# Patient Record
Sex: MALE | Race: BLACK OR AFRICAN AMERICAN | NOT HISPANIC OR LATINO | URBAN - METROPOLITAN AREA
[De-identification: names, ages, dates, MRNs, and addresses within clinical notes are randomized per-mention and may not be internally consistent; named-entity substitution may affect disease eponyms.]

---

## 2022-05-20 ENCOUNTER — OFFICE VISIT (OUTPATIENT)
Dept: URBAN - METROPOLITAN AREA CLINIC 118 | Facility: CLINIC | Age: 27
End: 2022-05-20
Payer: COMMERCIAL

## 2022-05-20 ENCOUNTER — LAB OUTSIDE AN ENCOUNTER (OUTPATIENT)
Dept: URBAN - METROPOLITAN AREA CLINIC 118 | Facility: CLINIC | Age: 27
End: 2022-05-20

## 2022-05-20 DIAGNOSIS — R12 HEARTBURN: ICD-10-CM

## 2022-05-20 DIAGNOSIS — K59.00 COLONIC CONSTIPATION: ICD-10-CM

## 2022-05-20 DIAGNOSIS — A04.8 HELICOBACTER PYLORI (H. PYLORI) INFECTION: ICD-10-CM

## 2022-05-20 DIAGNOSIS — R14.0 ABDOMINAL BLOATING: ICD-10-CM

## 2022-05-20 DIAGNOSIS — R10.13 DYSPEPSIA: ICD-10-CM

## 2022-05-20 PROCEDURE — 99204 OFFICE O/P NEW MOD 45 MIN: CPT | Performed by: INTERNAL MEDICINE

## 2022-05-20 RX ORDER — AMLODIPINE BESYLATE 10 MG/1
1 TABLET TABLET ORAL ONCE A DAY
Status: ACTIVE | COMMUNITY

## 2022-05-20 RX ORDER — PANTOPRAZOLE SODIUM 40 MG/1
1 TABLET TABLET, DELAYED RELEASE ORAL ONCE A DAY
Qty: 30 TABLET | Refills: 1 | OUTPATIENT
Start: 2022-05-20

## 2022-05-20 NOTE — HPI-TODAY'S VISIT:
This is a 27 yo male with pmh of anxiety here for evaluation for abdominal pain.  Complains of burning pain in the epigastric region. Unsure if related to eating. No nausea/vomiting.  Tried peptobismal.  He was tested positive for H pylori with breath test in 01/2022. He was given antibiotics but did not take the whole regimen.   He has been constipated since 01/2022. He has a BM at least once daily.  Has tried miralax and linzess samples. Also tried mag citrate. States he had diarrhea with linzess.   No rectal bleeding.

## 2022-05-31 ENCOUNTER — OFFICE VISIT (OUTPATIENT)
Dept: URBAN - METROPOLITAN AREA SURGERY CENTER 23 | Facility: SURGERY CENTER | Age: 27
End: 2022-05-31
Payer: COMMERCIAL

## 2022-05-31 DIAGNOSIS — B96.81 BACTERIAL INFECTION DUE TO H. PYLORI: ICD-10-CM

## 2022-05-31 DIAGNOSIS — K31.A11 INTESTINAL METAPLASIA OF ANTRUM OF STOMACH WITHOUT DYSPLASIA: ICD-10-CM

## 2022-05-31 DIAGNOSIS — K29.60 ADENOPAPILLOMATOSIS GASTRICA: ICD-10-CM

## 2022-05-31 DIAGNOSIS — K31.A12 INTESTINAL METAPLASIA OF BODY OF STOMACH WITHOUT DYSPLASIA: ICD-10-CM

## 2022-05-31 PROCEDURE — G8907 PT DOC NO EVENTS ON DISCHARG: HCPCS | Performed by: INTERNAL MEDICINE

## 2022-05-31 PROCEDURE — 43239 EGD BIOPSY SINGLE/MULTIPLE: CPT | Performed by: INTERNAL MEDICINE

## 2022-05-31 RX ORDER — AMLODIPINE BESYLATE 10 MG/1
1 TABLET TABLET ORAL ONCE A DAY
Status: ACTIVE | COMMUNITY

## 2022-05-31 RX ORDER — PANTOPRAZOLE SODIUM 40 MG/1
1 TABLET TABLET, DELAYED RELEASE ORAL ONCE A DAY
Qty: 30 TABLET | Refills: 1 | Status: ACTIVE | COMMUNITY
Start: 2022-05-20

## 2022-06-01 ENCOUNTER — ERX REFILL RESPONSE (OUTPATIENT)
Dept: URBAN - METROPOLITAN AREA CLINIC 118 | Facility: CLINIC | Age: 27
End: 2022-06-01

## 2022-06-01 RX ORDER — PANTOPRAZOLE SODIUM 40 MG/1
1 TABLET TABLET, DELAYED RELEASE ORAL ONCE A DAY
Qty: 30 TABLET | Refills: 1 | OUTPATIENT

## 2022-06-01 RX ORDER — PANTOPRAZOLE SODIUM 40 MG/1
TAKE 1 TABLET BY MOUTH EVERY DAY TABLET, DELAYED RELEASE ORAL
Qty: 90 TABLET | Refills: 1 | OUTPATIENT

## 2022-06-14 ENCOUNTER — TELEPHONE ENCOUNTER (OUTPATIENT)
Dept: URBAN - METROPOLITAN AREA CLINIC 118 | Facility: CLINIC | Age: 27
End: 2022-06-14

## 2022-06-20 ENCOUNTER — TELEPHONE ENCOUNTER (OUTPATIENT)
Dept: URBAN - METROPOLITAN AREA CLINIC 118 | Facility: CLINIC | Age: 27
End: 2022-06-20

## 2022-06-22 ENCOUNTER — TELEPHONE ENCOUNTER (OUTPATIENT)
Dept: URBAN - METROPOLITAN AREA CLINIC 92 | Facility: CLINIC | Age: 27
End: 2022-06-22

## 2022-06-22 ENCOUNTER — TELEPHONE ENCOUNTER (OUTPATIENT)
Dept: URBAN - METROPOLITAN AREA CLINIC 118 | Facility: CLINIC | Age: 27
End: 2022-06-22

## 2022-06-22 RX ORDER — AMOXICILLIN 500 MG/1
2 CAPSULES CAPSULE ORAL
Qty: 56 CAPSULE | Refills: 0 | OUTPATIENT
Start: 2022-06-22 | End: 2022-07-06

## 2022-06-22 RX ORDER — CLARITHROMYCIN 500 MG/1
1 TABLET TABLET, FILM COATED ORAL
Qty: 28 TABLET | Refills: 0 | OUTPATIENT
Start: 2022-06-22 | End: 2022-07-06

## 2022-06-23 ENCOUNTER — LAB OUTSIDE AN ENCOUNTER (OUTPATIENT)
Dept: URBAN - METROPOLITAN AREA CLINIC 118 | Facility: CLINIC | Age: 27
End: 2022-06-23

## 2022-06-23 ENCOUNTER — OFFICE VISIT (OUTPATIENT)
Dept: URBAN - METROPOLITAN AREA CLINIC 118 | Facility: CLINIC | Age: 27
End: 2022-06-23
Payer: COMMERCIAL

## 2022-06-23 VITALS
TEMPERATURE: 97.7 F | BODY MASS INDEX: 33.65 KG/M2 | HEART RATE: 71 BPM | HEIGHT: 69 IN | WEIGHT: 227.2 LBS | DIASTOLIC BLOOD PRESSURE: 89 MMHG | SYSTOLIC BLOOD PRESSURE: 142 MMHG

## 2022-06-23 DIAGNOSIS — K31.A11 INTESTINAL METAPLASIA OF ANTRUM OF STOMACH WITHOUT DYSPLASIA: ICD-10-CM

## 2022-06-23 DIAGNOSIS — K59.00 COLONIC CONSTIPATION: ICD-10-CM

## 2022-06-23 DIAGNOSIS — A04.8 HELICOBACTER PYLORI (H. PYLORI) INFECTION: ICD-10-CM

## 2022-06-23 DIAGNOSIS — R12 HEARTBURN: ICD-10-CM

## 2022-06-23 DIAGNOSIS — R10.13 DYSPEPSIA: ICD-10-CM

## 2022-06-23 DIAGNOSIS — R63.4 WEIGHT LOSS: ICD-10-CM

## 2022-06-23 DIAGNOSIS — R14.0 ABDOMINAL BLOATING: ICD-10-CM

## 2022-06-23 PROCEDURE — 99214 OFFICE O/P EST MOD 30 MIN: CPT | Performed by: INTERNAL MEDICINE

## 2022-06-23 RX ORDER — PANTOPRAZOLE SODIUM 40 MG/1
TAKE 1 TABLET BY MOUTH EVERY DAY TABLET, DELAYED RELEASE ORAL
Qty: 90 TABLET | Refills: 1 | Status: ACTIVE | COMMUNITY

## 2022-06-23 RX ORDER — CLARITHROMYCIN 500 MG/1
1 TABLET TABLET, FILM COATED ORAL
Qty: 28 TABLET | Refills: 0 | Status: ACTIVE | COMMUNITY
Start: 2022-06-22 | End: 2022-07-06

## 2022-06-23 RX ORDER — AMLODIPINE BESYLATE 10 MG/1
1 TABLET TABLET ORAL ONCE A DAY
Status: ACTIVE | COMMUNITY

## 2022-06-23 RX ORDER — AMOXICILLIN 500 MG/1
2 CAPSULES CAPSULE ORAL
Qty: 56 CAPSULE | Refills: 0 | Status: ACTIVE | COMMUNITY
Start: 2022-06-22 | End: 2022-07-06

## 2022-06-23 NOTE — HPI-TODAY'S VISIT:
This is a 27 yo male with pmh of anxiety here for evaluation for abdominal pain.  Since last visit, he had EGD, which showed mild erythema in the antrum. Path showed chronic gastritis with intestinal metaplasia and H pylori infection.  He is concerned about the H pylori.  Today, he reports having weight loss for the past 4 months or so with 20 lbs. Has been taking fiber and miralax. Has a BM every 1-2 days. No rectal bleeding.   Prior hx:  He was tested positive for H pylori with breath test in 01/2022. He was given antibiotics but did not take the whole regimen.

## 2022-06-24 PROBLEM — 35298007: Status: ACTIVE | Noted: 2022-05-20

## 2022-06-29 ENCOUNTER — OFFICE VISIT (OUTPATIENT)
Dept: URBAN - METROPOLITAN AREA SURGERY CENTER 23 | Facility: SURGERY CENTER | Age: 27
End: 2022-06-29
Payer: COMMERCIAL

## 2022-06-29 DIAGNOSIS — K63.5 BENIGN COLON POLYP: ICD-10-CM

## 2022-06-29 DIAGNOSIS — R19.4 ALTERATION IN BOWEL ELIMINATION: ICD-10-CM

## 2022-06-29 DIAGNOSIS — K59.09 CHANGE IN BOWEL MOVEMENTS INTERMITTENT CONSTIPATION. URGENCY IN THE MORNING.: ICD-10-CM

## 2022-06-29 DIAGNOSIS — R63.4 ABNORMAL INTENTIONAL WEIGHT LOSS: ICD-10-CM

## 2022-06-29 PROCEDURE — 45380 COLONOSCOPY AND BIOPSY: CPT | Performed by: INTERNAL MEDICINE

## 2022-06-29 PROCEDURE — G8907 PT DOC NO EVENTS ON DISCHARG: HCPCS | Performed by: INTERNAL MEDICINE

## 2022-08-03 ENCOUNTER — ERX REFILL RESPONSE (OUTPATIENT)
Dept: URBAN - METROPOLITAN AREA CLINIC 118 | Facility: CLINIC | Age: 27
End: 2022-08-03

## 2022-08-03 RX ORDER — PANTOPRAZOLE SODIUM 40 MG/1
TAKE 1 TABLET BY MOUTH EVERY DAY TABLET, DELAYED RELEASE ORAL
Qty: 30 TABLET | Refills: 0 | OUTPATIENT

## 2022-08-03 RX ORDER — PANTOPRAZOLE SODIUM 40 MG/1
TAKE 1 TABLET BY MOUTH EVERY DAY TABLET, DELAYED RELEASE ORAL
Qty: 90 TABLET | Refills: 1 | OUTPATIENT

## 2022-08-08 ENCOUNTER — ERX REFILL RESPONSE (OUTPATIENT)
Dept: URBAN - METROPOLITAN AREA CLINIC 118 | Facility: CLINIC | Age: 27
End: 2022-08-08

## 2022-08-08 RX ORDER — PANTOPRAZOLE SODIUM 40 MG/1
TAKE 1 TABLET BY MOUTH EVERY DAY TABLET, DELAYED RELEASE ORAL
Qty: 90 TABLET | Refills: 0 | OUTPATIENT

## 2022-08-08 RX ORDER — PANTOPRAZOLE SODIUM 40 MG/1
TAKE 1 TABLET BY MOUTH EVERY DAY TABLET, DELAYED RELEASE ORAL
Qty: 30 TABLET | Refills: 0 | OUTPATIENT

## 2023-07-27 ENCOUNTER — WEB ENCOUNTER (OUTPATIENT)
Dept: URBAN - METROPOLITAN AREA CLINIC 94 | Facility: CLINIC | Age: 28
End: 2023-07-27

## 2023-07-27 ENCOUNTER — OFFICE VISIT (OUTPATIENT)
Dept: URBAN - METROPOLITAN AREA CLINIC 94 | Facility: CLINIC | Age: 28
End: 2023-07-27
Payer: COMMERCIAL

## 2023-07-27 ENCOUNTER — LAB OUTSIDE AN ENCOUNTER (OUTPATIENT)
Dept: URBAN - METROPOLITAN AREA CLINIC 94 | Facility: CLINIC | Age: 28
End: 2023-07-27

## 2023-07-27 VITALS
BODY MASS INDEX: 29.62 KG/M2 | TEMPERATURE: 97.5 F | WEIGHT: 200 LBS | HEIGHT: 69 IN | SYSTOLIC BLOOD PRESSURE: 131 MMHG | HEART RATE: 60 BPM | DIASTOLIC BLOOD PRESSURE: 83 MMHG

## 2023-07-27 DIAGNOSIS — K31.A11 INTESTINAL METAPLASIA OF ANTRUM OF STOMACH WITHOUT DYSPLASIA: ICD-10-CM

## 2023-07-27 DIAGNOSIS — K59.01 CONSTIPATION: ICD-10-CM

## 2023-07-27 DIAGNOSIS — R12 HEARTBURN: ICD-10-CM

## 2023-07-27 DIAGNOSIS — R10.13 DYSPEPSIA: ICD-10-CM

## 2023-07-27 PROCEDURE — 99214 OFFICE O/P EST MOD 30 MIN: CPT | Performed by: INTERNAL MEDICINE

## 2023-07-27 RX ORDER — OMEPRAZOLE 40 MG/1
1 CAPSULE 30 MINUTES BEFORE MORNING MEAL CAPSULE, DELAYED RELEASE ORAL ONCE A DAY
Qty: 30 | Refills: 3 | OUTPATIENT
Start: 2023-07-27

## 2023-07-27 RX ORDER — PANTOPRAZOLE SODIUM 40 MG/1
TAKE 1 TABLET BY MOUTH EVERY DAY TABLET, DELAYED RELEASE ORAL PRN
Refills: 0 | Status: ACTIVE | COMMUNITY

## 2023-07-27 RX ORDER — AMLODIPINE BESYLATE 10 MG/1
1 TABLET TABLET ORAL ONCE A DAY
Status: ACTIVE | COMMUNITY

## 2023-07-27 NOTE — HPI-TODAY'S VISIT:
27 yo M evaluated today for abdominal pain with hx of constipation and hx of H. Pylori infection  Pt reports frequent straining to have a BM. Denies feeling fully evacuated. Denies bloody stools or melena. May have BM daily to every other day. He will use prune juice, tried Benefiber with mixed results. Miralax also helped some. Sx started in early 2022 Pt previously on Linzess which helped.   Colonoscopy 6/2022- 2 hyperplastic polyps and IH.   Pt denies abdominal pain but reports periods of epigastric burning with increased bleching. Denies N/V.   Pt has been tx twice for H. Pylori EGD, 5/2022 which showed mild erythema in the antrum. Path showed chronic gastritis with intestinal metaplasia and H pylori infection.  Pt completed abx course and H. Pylori HBT 2/2023- negative.  DR Reeder, former GI had recommended repeating EGD 4 weeks later and doing gastric mapping due to IM  He was tested positive for H pylori with breath test in 01/2022. He was given antibiotics but did not take the whole regimen.

## 2023-08-03 ENCOUNTER — OFFICE VISIT (OUTPATIENT)
Dept: URBAN - METROPOLITAN AREA SURGERY CENTER 17 | Facility: SURGERY CENTER | Age: 28
End: 2023-08-03

## 2023-08-04 ENCOUNTER — OFFICE VISIT (OUTPATIENT)
Dept: URBAN - METROPOLITAN AREA SURGERY CENTER 17 | Facility: SURGERY CENTER | Age: 28
End: 2023-08-04
Payer: COMMERCIAL

## 2023-08-04 ENCOUNTER — OFFICE VISIT (OUTPATIENT)
Dept: URBAN - METROPOLITAN AREA SURGERY CENTER 17 | Facility: SURGERY CENTER | Age: 28
End: 2023-08-04

## 2023-08-04 DIAGNOSIS — K29.60 ADENOPAPILLOMATOSIS GASTRICA: ICD-10-CM

## 2023-08-04 DIAGNOSIS — K21.00 ALKALINE REFLUX ESOPHAGITIS: ICD-10-CM

## 2023-08-04 DIAGNOSIS — K31.7 BENIGN GASTRIC POLYP: ICD-10-CM

## 2023-08-04 PROCEDURE — 43239 EGD BIOPSY SINGLE/MULTIPLE: CPT | Performed by: INTERNAL MEDICINE

## 2023-08-04 PROCEDURE — G8907 PT DOC NO EVENTS ON DISCHARG: HCPCS | Performed by: INTERNAL MEDICINE

## 2023-08-04 RX ORDER — AMLODIPINE BESYLATE 10 MG/1
1 TABLET TABLET ORAL ONCE A DAY
Status: ACTIVE | COMMUNITY

## 2023-08-04 RX ORDER — PANTOPRAZOLE SODIUM 40 MG/1
TAKE 1 TABLET BY MOUTH EVERY DAY TABLET, DELAYED RELEASE ORAL PRN
Refills: 0 | Status: ACTIVE | COMMUNITY

## 2023-08-04 RX ORDER — OMEPRAZOLE 40 MG/1
1 CAPSULE 30 MINUTES BEFORE MORNING MEAL CAPSULE, DELAYED RELEASE ORAL ONCE A DAY
Qty: 30 | Refills: 3 | Status: ACTIVE | COMMUNITY
Start: 2023-07-27

## 2023-08-07 ENCOUNTER — TELEPHONE ENCOUNTER (OUTPATIENT)
Dept: URBAN - METROPOLITAN AREA CLINIC 94 | Facility: CLINIC | Age: 28
End: 2023-08-07

## 2023-08-18 ENCOUNTER — TELEPHONE ENCOUNTER (OUTPATIENT)
Dept: URBAN - METROPOLITAN AREA CLINIC 94 | Facility: CLINIC | Age: 28
End: 2023-08-18

## 2023-08-24 ENCOUNTER — WEB ENCOUNTER (OUTPATIENT)
Dept: URBAN - METROPOLITAN AREA CLINIC 94 | Facility: CLINIC | Age: 28
End: 2023-08-24

## 2023-08-24 ENCOUNTER — OFFICE VISIT (OUTPATIENT)
Dept: URBAN - METROPOLITAN AREA CLINIC 94 | Facility: CLINIC | Age: 28
End: 2023-08-24
Payer: COMMERCIAL

## 2023-08-24 VITALS
SYSTOLIC BLOOD PRESSURE: 126 MMHG | HEART RATE: 80 BPM | BODY MASS INDEX: 29.77 KG/M2 | DIASTOLIC BLOOD PRESSURE: 80 MMHG | WEIGHT: 201 LBS | HEIGHT: 69 IN | TEMPERATURE: 97.7 F

## 2023-08-24 DIAGNOSIS — K31.A12 INTESTINAL METAPLASIA OF BODY OF STOMACH WITHOUT DYSPLASIA: ICD-10-CM

## 2023-08-24 DIAGNOSIS — R12 HEARTBURN: ICD-10-CM

## 2023-08-24 DIAGNOSIS — R10.13 DYSPEPSIA: ICD-10-CM

## 2023-08-24 DIAGNOSIS — K59.01 CONSTIPATION: ICD-10-CM

## 2023-08-24 PROCEDURE — 99214 OFFICE O/P EST MOD 30 MIN: CPT | Performed by: INTERNAL MEDICINE

## 2023-08-24 RX ORDER — LINACLOTIDE 72 UG/1
1 CAPSULE AT LEAST 30 MINUTES BEFORE THE FIRST MEAL OF THE DAY ON AN EMPTY STOMACH CAPSULE, GELATIN COATED ORAL ONCE A DAY
Qty: 30 | Refills: 3 | OUTPATIENT
Start: 2023-08-24 | End: 2023-12-21

## 2023-08-24 RX ORDER — AMLODIPINE BESYLATE 10 MG/1
1 TABLET TABLET ORAL ONCE A DAY
Status: ACTIVE | COMMUNITY

## 2023-08-24 RX ORDER — PANTOPRAZOLE SODIUM 40 MG/1
TAKE 1 TABLET BY MOUTH EVERY DAY TABLET, DELAYED RELEASE ORAL PRN
Refills: 0 | Status: ACTIVE | COMMUNITY

## 2023-08-24 NOTE — HPI-TODAY'S VISIT:
29 yo M evaluated today for abdominal pain with hx of constipation and hx of H. Pylori infection  Since his last visit,   EGD 8/23- nml esophagus, patchy atrophic mucosa entire stomach, Localized nodular mucosa, with normal duodenum 4 Bx taken- gastric mapping -  Bx reveals Greated Stomach body - mild gastritis with focal intestinal metaplasia, negative H. Pylori  Lesser stomach body, lesser stomach antrum, and greater stomach antrum revealed mild gastritis, negative H. Pylori. Polypectomy - stomach antrum - hyperplastic polyp with mild gastritis.   Today patient denies upper GI sx. He continues to suffer wit constipation and would like discuss alternative natural options. He is trying to address issues by changing diet   Pt reports frequent straining to have a BM. Denies feeling fully evacuated. Denies bloody stools or melena. May have BM daily to every other day. He will use prune juice, tried Benefiber with mixed results. Miralax also helped some. Sx started in early 2022 Pt previously on Linzess which helped.   Colonoscopy 6/2022- 2 hyperplastic polyps and IH.   Pt denies abdominal pain but reports periods of epigastric burning with increased bleching. Denies N/V.   Pt has been tx twice for H. Pylori EGD, 5/2022 which showed mild erythema in the antrum. Path showed chronic gastritis with intestinal metaplasia and H pylori infection.  Pt completed abx course and H. Pylori HBT 2/2023- negative.  DR Reeder, former GI had recommended repeating EGD 4 weeks later and doing gastric mapping due to IM  He was tested positive for H pylori with breath test in 01/2022. He was given antibiotics but did not take the whole regimen.

## 2023-08-25 ENCOUNTER — OFFICE VISIT (OUTPATIENT)
Dept: URBAN - METROPOLITAN AREA CLINIC 94 | Facility: CLINIC | Age: 28
End: 2023-08-25

## 2023-10-04 ENCOUNTER — TELEPHONE ENCOUNTER (OUTPATIENT)
Dept: URBAN - METROPOLITAN AREA CLINIC 94 | Facility: CLINIC | Age: 28
End: 2023-10-04

## 2023-10-04 PROBLEM — 82934008: Status: ACTIVE | Noted: 2023-10-04

## 2023-10-04 RX ORDER — LINACLOTIDE 72 UG/1
1 CAPSULE AT LEAST 30 MINUTES BEFORE THE FIRST MEAL OF THE DAY ON AN EMPTY STOMACH CAPSULE, GELATIN COATED ORAL ONCE A DAY
Qty: 30 | Refills: 3
Start: 2023-08-24 | End: 2024-02-01

## 2023-10-12 ENCOUNTER — TELEPHONE ENCOUNTER (OUTPATIENT)
Dept: URBAN - METROPOLITAN AREA CLINIC 94 | Facility: CLINIC | Age: 28
End: 2023-10-12

## 2023-10-12 RX ORDER — PLECANATIDE 3 MG/1
1 TABLET TABLET ORAL ONCE A DAY
Qty: 30 | Refills: 5 | OUTPATIENT
Start: 2023-10-12 | End: 2024-04-09

## 2023-11-13 ENCOUNTER — OFFICE VISIT (OUTPATIENT)
Dept: URBAN - METROPOLITAN AREA CLINIC 94 | Facility: CLINIC | Age: 28
End: 2023-11-13
Payer: COMMERCIAL

## 2023-11-13 ENCOUNTER — LAB OUTSIDE AN ENCOUNTER (OUTPATIENT)
Dept: URBAN - METROPOLITAN AREA CLINIC 94 | Facility: CLINIC | Age: 28
End: 2023-11-13

## 2023-11-13 ENCOUNTER — DASHBOARD ENCOUNTERS (OUTPATIENT)
Age: 28
End: 2023-11-13

## 2023-11-13 VITALS
DIASTOLIC BLOOD PRESSURE: 77 MMHG | OXYGEN SATURATION: 97 % | WEIGHT: 198 LBS | HEART RATE: 80 BPM | TEMPERATURE: 97 F | HEIGHT: 69 IN | BODY MASS INDEX: 29.33 KG/M2 | SYSTOLIC BLOOD PRESSURE: 147 MMHG

## 2023-11-13 DIAGNOSIS — R12 HEARTBURN: ICD-10-CM

## 2023-11-13 DIAGNOSIS — R10.13 DYSPEPSIA: ICD-10-CM

## 2023-11-13 DIAGNOSIS — K31.A12 INTESTINAL METAPLASIA OF BODY OF STOMACH WITHOUT DYSPLASIA: ICD-10-CM

## 2023-11-13 DIAGNOSIS — K59.04 CHRONIC IDIOPATHIC CONSTIPATION: ICD-10-CM

## 2023-11-13 PROCEDURE — 99214 OFFICE O/P EST MOD 30 MIN: CPT | Performed by: INTERNAL MEDICINE

## 2023-11-13 RX ORDER — PANTOPRAZOLE SODIUM 40 MG/1
TAKE 1 TABLET BY MOUTH EVERY DAY TABLET, DELAYED RELEASE ORAL PRN
Refills: 0 | Status: ACTIVE | COMMUNITY

## 2023-11-13 RX ORDER — PLECANATIDE 3 MG/1
1 TABLET TABLET ORAL ONCE A DAY
Qty: 30 | Refills: 5 | Status: ACTIVE | COMMUNITY
Start: 2023-10-12 | End: 2024-04-09

## 2023-11-13 RX ORDER — LINACLOTIDE 72 UG/1
1 CAPSULE AT LEAST 30 MINUTES BEFORE THE FIRST MEAL OF THE DAY ON AN EMPTY STOMACH CAPSULE, GELATIN COATED ORAL ONCE A DAY
Qty: 30 | Refills: 3 | OUTPATIENT

## 2023-11-13 RX ORDER — LINACLOTIDE 72 UG/1
1 CAPSULE AT LEAST 30 MINUTES BEFORE THE FIRST MEAL OF THE DAY ON AN EMPTY STOMACH CAPSULE, GELATIN COATED ORAL ONCE A DAY
Qty: 30 | Refills: 3 | Status: ACTIVE | COMMUNITY
Start: 2023-08-24 | End: 2024-02-01

## 2023-11-13 RX ORDER — AMLODIPINE BESYLATE 10 MG/1
1 TABLET TABLET ORAL ONCE A DAY
Status: ACTIVE | COMMUNITY

## 2023-11-13 NOTE — HPI-TODAY'S VISIT:
27 yo M evaluated today for abdominal pain with hx of constipation and GERD   Since his last visit, patient actually reports lower GI sx have gotten worse. Pt reports difficulty with bowel habits. He has to lift his legs, and shift his body just to get stool out. Insurance will not cover Linzess. He usinmg Dulcolax and stool softeners prn. He denies even having the feeling of needing to have BM. Denies bloody stools or melena. May have BM daily to every other day. He will use prune juice, tried Benefiber with mixed results. Miralax also helped some. Sx started in early 2022   Upper GI sx improved unless he eats late and lies flat or eats something acidic. Pt continues Pantoprazle 40 mg daily.   EGD 8/23- nml esophagus, patchy atrophic mucosa entire stomach, Localized nodular mucosa, with normal duodenum 4 Bx taken- gastric mapping -  Bx reveals Greated Stomach body - mild gastritis with focal intestinal metaplasia, negative H. Pylori  Lesser stomach body, lesser stomach antrum, and greater stomach antrum revealed mild gastritis, negative H. Pylori. Polypectomy - stomach antrum - hyperplastic polyp with mild gastritis.  Colonoscopy 6/2022- 2 hyperplastic polyps and IH.  Pt has been tx twice for H. Pylori EGD, 5/2022 which showed mild erythema in the antrum. Path showed chronic gastritis with intestinal metaplasia and H pylori infection.  Pt completed abx course and H. Pylori HBT 2/2023- negative.  DR Reeder, former GI had recommended repeating EGD 4 weeks later and doing gastric mapping due to IM  He was tested positive for H pylori with breath test in 01/2022. He was given antibiotics but did not take the whole regimen.

## 2023-11-14 LAB
IMMUNOGLOBULIN A, QN, SERUM: 290
MAGNESIUM: 1.9
T-TRANSGLUTAMINASE (TTG) IGA: <1
TSH: 3.36

## 2023-11-15 ENCOUNTER — TELEPHONE ENCOUNTER (OUTPATIENT)
Dept: URBAN - METROPOLITAN AREA CLINIC 94 | Facility: CLINIC | Age: 28
End: 2023-11-15

## 2023-11-22 ENCOUNTER — OFFICE VISIT (OUTPATIENT)
Dept: URBAN - METROPOLITAN AREA CLINIC 94 | Facility: CLINIC | Age: 28
End: 2023-11-22

## 2023-11-22 ENCOUNTER — TELEPHONE ENCOUNTER (OUTPATIENT)
Dept: URBAN - METROPOLITAN AREA CLINIC 94 | Facility: CLINIC | Age: 28
End: 2023-11-22

## 2023-11-29 ENCOUNTER — TELEPHONE ENCOUNTER (OUTPATIENT)
Dept: URBAN - METROPOLITAN AREA CLINIC 6 | Facility: CLINIC | Age: 28
End: 2023-11-29